# Patient Record
Sex: MALE | Race: WHITE | NOT HISPANIC OR LATINO | Employment: FULL TIME | URBAN - METROPOLITAN AREA
[De-identification: names, ages, dates, MRNs, and addresses within clinical notes are randomized per-mention and may not be internally consistent; named-entity substitution may affect disease eponyms.]

---

## 2024-10-17 ENCOUNTER — HOSPITAL ENCOUNTER (EMERGENCY)
Facility: HOSPITAL | Age: 34
Discharge: HOME | End: 2024-10-17
Attending: EMERGENCY MEDICINE
Payer: COMMERCIAL

## 2024-10-17 VITALS
HEIGHT: 69 IN | TEMPERATURE: 100 F | WEIGHT: 220 LBS | HEART RATE: 98 BPM | RESPIRATION RATE: 20 BRPM | BODY MASS INDEX: 32.58 KG/M2 | DIASTOLIC BLOOD PRESSURE: 77 MMHG | SYSTOLIC BLOOD PRESSURE: 141 MMHG | OXYGEN SATURATION: 97 %

## 2024-10-17 DIAGNOSIS — B34.9 VIRAL SYNDROME: Primary | ICD-10-CM

## 2024-10-17 DIAGNOSIS — R68.83 CHILLS: ICD-10-CM

## 2024-10-17 LAB
ALBUMIN SERPL BCP-MCNC: 4.2 G/DL (ref 3.5–5.2)
ALP SERPL-CCNC: 76 U/L (ref 40–150)
ALT SERPL W/O P-5'-P-CCNC: 43 U/L (ref 10–44)
ANION GAP SERPL CALC-SCNC: 12 MMOL/L (ref 8–16)
AST SERPL-CCNC: 23 U/L (ref 10–40)
BASOPHILS # BLD AUTO: 0.02 K/UL (ref 0–0.2)
BASOPHILS NFR BLD: 0.3 % (ref 0–1.9)
BILIRUB SERPL-MCNC: 0.5 MG/DL (ref 0.1–1)
BUN SERPL-MCNC: 19 MG/DL (ref 6–20)
CALCIUM SERPL-MCNC: 9.5 MG/DL (ref 8.7–10.5)
CHLORIDE SERPL-SCNC: 105 MMOL/L (ref 95–110)
CO2 SERPL-SCNC: 22 MMOL/L (ref 23–29)
CREAT SERPL-MCNC: 1.1 MG/DL (ref 0.5–1.4)
DIFFERENTIAL METHOD BLD: ABNORMAL
EOSINOPHIL # BLD AUTO: 0.1 K/UL (ref 0–0.5)
EOSINOPHIL NFR BLD: 1.5 % (ref 0–8)
ERYTHROCYTE [DISTWIDTH] IN BLOOD BY AUTOMATED COUNT: 13.2 % (ref 11.5–14.5)
EST. GFR  (NO RACE VARIABLE): >60 ML/MIN/1.73 M^2
GLUCOSE SERPL-MCNC: 104 MG/DL (ref 70–110)
HCT VFR BLD AUTO: 46.3 % (ref 40–54)
HGB BLD-MCNC: 15.7 G/DL (ref 14–18)
IMM GRANULOCYTES # BLD AUTO: 0.03 K/UL (ref 0–0.04)
IMM GRANULOCYTES NFR BLD AUTO: 0.5 % (ref 0–0.5)
LIPASE SERPL-CCNC: 38 U/L (ref 4–60)
LYMPHOCYTES # BLD AUTO: 0.8 K/UL (ref 1–4.8)
LYMPHOCYTES NFR BLD: 11.8 % (ref 18–48)
MCH RBC QN AUTO: 29.8 PG (ref 27–31)
MCHC RBC AUTO-ENTMCNC: 33.9 G/DL (ref 32–36)
MCV RBC AUTO: 88 FL (ref 82–98)
MONOCYTES # BLD AUTO: 0.8 K/UL (ref 0.3–1)
MONOCYTES NFR BLD: 11.6 % (ref 4–15)
NEUTROPHILS # BLD AUTO: 4.8 K/UL (ref 1.8–7.7)
NEUTROPHILS NFR BLD: 74.3 % (ref 38–73)
NRBC BLD-RTO: 0 /100 WBC
PLATELET # BLD AUTO: 175 K/UL (ref 150–450)
PMV BLD AUTO: 9.7 FL (ref 9.2–12.9)
POTASSIUM SERPL-SCNC: 3.8 MMOL/L (ref 3.5–5.1)
PROT SERPL-MCNC: 7 G/DL (ref 6–8.4)
RBC # BLD AUTO: 5.27 M/UL (ref 4.6–6.2)
SARS-COV-2 RDRP RESP QL NAA+PROBE: NEGATIVE
SODIUM SERPL-SCNC: 139 MMOL/L (ref 136–145)
WBC # BLD AUTO: 6.46 K/UL (ref 3.9–12.7)

## 2024-10-17 PROCEDURE — 87389 HIV-1 AG W/HIV-1&-2 AB AG IA: CPT | Performed by: EMERGENCY MEDICINE

## 2024-10-17 PROCEDURE — 86803 HEPATITIS C AB TEST: CPT | Performed by: EMERGENCY MEDICINE

## 2024-10-17 PROCEDURE — 85025 COMPLETE CBC W/AUTO DIFF WBC: CPT | Performed by: EMERGENCY MEDICINE

## 2024-10-17 PROCEDURE — 71045 X-RAY EXAM CHEST 1 VIEW: CPT | Mod: 26,,, | Performed by: RADIOLOGY

## 2024-10-17 PROCEDURE — 87635 SARS-COV-2 COVID-19 AMP PRB: CPT | Performed by: EMERGENCY MEDICINE

## 2024-10-17 PROCEDURE — 99284 EMERGENCY DEPT VISIT MOD MDM: CPT | Mod: 25

## 2024-10-17 PROCEDURE — 71045 X-RAY EXAM CHEST 1 VIEW: CPT | Mod: TC

## 2024-10-17 PROCEDURE — 83690 ASSAY OF LIPASE: CPT | Performed by: EMERGENCY MEDICINE

## 2024-10-17 PROCEDURE — 80053 COMPREHEN METABOLIC PANEL: CPT | Performed by: EMERGENCY MEDICINE

## 2024-10-17 RX ORDER — ANASTROZOLE 1 MG/1
TABLET ORAL
COMMUNITY
Start: 2024-10-14

## 2024-10-18 LAB
HCV AB SERPL QL IA: NORMAL
HIV 1+2 AB+HIV1 P24 AG SERPL QL IA: NORMAL

## 2024-10-18 NOTE — ED NOTES
Pt states he was standing at daughter soccer practice when started having chills with right sided ABD pain. No nausea or vomiting. Pt reports feeling better when lying down or on left side. Pt states pain is a 7 on 0/10 now. Pt reports having frequent BM today that were normal. Pt report Hx of Diverticulitis. Pt states he voids often but feel like it has decreased more than usual today. Last void at 4pm.

## 2024-10-18 NOTE — DISCHARGE INSTRUCTIONS
Take Tylenol/Motrin as needed for fever or chills.  Drink plenty of fluids.  Follow up with your primary care provider.

## 2024-10-18 NOTE — ED PROVIDER NOTES
Encounter Date: 10/17/2024       History     Chief Complaint   Patient presents with    Abdominal Pain     R side abdominal pain that started today     Patient presents to the emergency department for evaluation right-sided rib pain and chills.  Patient states he developed some chills this evening suddenly.  He has not checked his temperature.  He denies any nausea or vomiting.  He states he has developed some soreness now in his right lateral/inferior ribs.  He denies abdominal pain.  He denies any chest pain or palpitations.  He states his pain is worse with position and with coughing.  He denies any other complaints.    The history is provided by the patient.     Review of patient's allergies indicates:   Allergen Reactions    Iodine Rash     Topical      Past Medical History:   Diagnosis Date    Anxiety     Asthma     Diverticulitis      Past Surgical History:   Procedure Laterality Date    COLONOSCOPY  07/2022    TONSILLECTOMY, ADENOIDECTOMY Bilateral 12/29/2022    Procedure: TONSILLECTOMY AND ADENOIDECTOMY;  Surgeon: Avtar Wang MD;  Location: [facility];  Service: ENT;  Laterality: Bilateral;     Family History   Problem Relation Name Age of Onset    Cancer Mother      Gout Father      Cancer Maternal Grandfather      Gout Maternal Grandfather      Diabetes Paternal Grandfather       Social History     Tobacco Use    Smoking status: Never    Smokeless tobacco: Never   Substance Use Topics    Alcohol use: Yes     Comment: social     Drug use: Never     Review of Systems   Constitutional:  Positive for chills. Negative for activity change, appetite change, diaphoresis and fever.   HENT:  Negative for congestion, ear discharge, ear pain, nosebleeds, rhinorrhea, sore throat and trouble swallowing.    Eyes:  Negative for photophobia, pain, discharge and redness.   Respiratory:  Positive for cough. Negative for choking, chest tightness, shortness of breath and wheezing.    Cardiovascular:  Negative for chest  pain, palpitations and leg swelling.   Gastrointestinal:  Negative for abdominal pain, blood in stool, constipation, nausea and vomiting.   Endocrine: Negative for polydipsia and polyphagia.   Genitourinary:  Negative for dysuria, frequency and urgency.   Musculoskeletal:  Negative for back pain and neck pain.   Skin:  Negative for rash and wound.   Neurological:  Negative for dizziness, seizures, weakness, numbness and headaches.   All other systems reviewed and are negative.      Physical Exam     Initial Vitals   BP Pulse Resp Temp SpO2   10/17/24 2023 10/17/24 2021 10/17/24 2021 10/17/24 2021 10/17/24 2021   (!) 172/82 98 20 99.6 °F (37.6 °C) 97 %      MAP       --                Physical Exam    Nursing note and vitals reviewed.  Constitutional: He appears well-developed and well-nourished. No distress.   HENT:   Head: Normocephalic and atraumatic.   Right Ear: External ear normal.   Left Ear: External ear normal. Mouth/Throat: Oropharynx is clear and moist.   Eyes: EOM are normal. Pupils are equal, round, and reactive to light. Right eye exhibits no discharge.   Neck: Neck supple. No tracheal deviation present. No JVD present.   Normal range of motion.  Cardiovascular:  Normal rate and regular rhythm.           No murmur heard.  Pulmonary/Chest: Breath sounds normal. No respiratory distress. He has no wheezes. He has no rales.   Abdominal: Abdomen is soft. Bowel sounds are normal. He exhibits no distension. There is no abdominal tenderness.   Musculoskeletal:         General: Tenderness present. Normal range of motion.      Cervical back: Normal range of motion and neck supple.      Comments: Patient has palpatory tenderness in his right lateral/inferior ribs.  There is no evidence of crepitance or traumatic injury noted.     Neurological: He is alert and oriented to person, place, and time. He has normal strength. No cranial nerve deficit.   Skin: Skin is warm and dry. Capillary refill takes less than 2  seconds. No rash noted.         ED Course   Procedures  Labs Reviewed   CBC W/ AUTO DIFFERENTIAL - Abnormal       Result Value    WBC 6.46      RBC 5.27      Hemoglobin 15.7      Hematocrit 46.3      MCV 88      MCH 29.8      MCHC 33.9      RDW 13.2      Platelets 175      MPV 9.7      Immature Granulocytes 0.5      Gran # (ANC) 4.8      Immature Grans (Abs) 0.03      Lymph # 0.8 (*)     Mono # 0.8      Eos # 0.1      Baso # 0.02      nRBC 0      Gran % 74.3 (*)     Lymph % 11.8 (*)     Mono % 11.6      Eosinophil % 1.5      Basophil % 0.3      Differential Method Automated      Narrative:     Release to patient->Immediate   COMPREHENSIVE METABOLIC PANEL - Abnormal    Sodium 139      Potassium 3.8      Chloride 105      CO2 22 (*)     Glucose 104      BUN 19      Creatinine 1.1      Calcium 9.5      Total Protein 7.0      Albumin 4.2      Total Bilirubin 0.5      Alkaline Phosphatase 76      AST 23      ALT 43      eGFR >60.0      Anion Gap 12      Narrative:     Release to patient->Immediate   SARS-COV-2 RNA AMPLIFICATION, QUAL    SARS-CoV-2 RNA, Amplification, Qual Negative     LIPASE    Lipase 38      Narrative:     Release to patient->Immediate   HIV 1 / 2 ANTIBODY   HEPATITIS C ANTIBODY          Imaging Results              X-Ray Chest AP Portable (Final result)  Result time 10/17/24 21:09:53      Final result by Maya Whipple MD (10/17/24 21:09:53)                   Impression:      No acute intrathoracic abnormality identified on this single radiographic view of the chest.      Electronically signed by: Maya Whipple MD  Date:    10/17/2024  Time:    21:09               Narrative:    EXAMINATION:  XR CHEST AP PORTABLE    CLINICAL HISTORY:  Chills (without fever)    TECHNIQUE:  Single frontal view of the chest was performed.    COMPARISON:  12/28/2022    FINDINGS:  The cardiomediastinal silhouette is within normal limits. The visualized airway is unremarkable.  Mild asymmetric elevation of the right  hemidiaphragm.  The lungs demonstrate no evidence of confluent airspace consolidation, significant volume of pleural fluid or pneumothorax.  Visualized osseous structures are intact.                                       Medications - No data to display  Medical Decision Making  History is obtained from the patient.  All labs and x-rays are reviewed by myself.  Differential diagnosis includes, but is not limited to, pneumonia/pleurisy/coli cystitis/viral syndrome/pleuritic pain  Patient has no limitation to access to healthcare    Lab work and chest x-ray revealed no acute process.  There is no evidence of infection noted anywhere.  Patient has a negative COVID as well.  We will discharge patient home to follow up with his primary care provider.  I discussed all this with the patient and answered all his questions prior to discharge.    Amount and/or Complexity of Data Reviewed  Labs: ordered.  Radiology: ordered.                                      Clinical Impression:  Final diagnoses:  [R68.83] Chills  [B34.9] Viral syndrome (Primary)          ED Disposition Condition    Discharge Stable          ED Prescriptions    None       Follow-up Information       Follow up With Specialties Details Why Contact Info    Sumanth Hunt MD Family Medicine  As needed [id number] [place] [id number]               Philip Ortiz, DO  10/17/24 1974

## 2024-10-20 ENCOUNTER — HOSPITAL ENCOUNTER (EMERGENCY)
Facility: HOSPITAL | Age: 34
Discharge: HOME | End: 2024-10-20
Attending: EMERGENCY MEDICINE
Payer: COMMERCIAL

## 2024-10-20 VITALS
TEMPERATURE: 98 F | SYSTOLIC BLOOD PRESSURE: 136 MMHG | DIASTOLIC BLOOD PRESSURE: 88 MMHG | OXYGEN SATURATION: 99 % | HEIGHT: 69 IN | RESPIRATION RATE: 19 BRPM | HEART RATE: 81 BPM | WEIGHT: 220 LBS | BODY MASS INDEX: 32.58 KG/M2

## 2024-10-20 DIAGNOSIS — R07.89 CHEST WALL PAIN: ICD-10-CM

## 2024-10-20 LAB
ALBUMIN SERPL BCP-MCNC: 4.1 G/DL (ref 3.5–5.2)
ALP SERPL-CCNC: 69 U/L (ref 40–150)
ALT SERPL W/O P-5'-P-CCNC: 33 U/L (ref 10–44)
ANION GAP SERPL CALC-SCNC: 9 MMOL/L (ref 8–16)
AST SERPL-CCNC: 27 U/L (ref 10–40)
BASOPHILS # BLD AUTO: 0.01 K/UL (ref 0–0.2)
BASOPHILS NFR BLD: 0.2 % (ref 0–1.9)
BILIRUB SERPL-MCNC: 0.3 MG/DL (ref 0.1–1)
BUN SERPL-MCNC: 13 MG/DL (ref 6–20)
CALCIUM SERPL-MCNC: 9.2 MG/DL (ref 8.7–10.5)
CHLORIDE SERPL-SCNC: 107 MMOL/L (ref 95–110)
CO2 SERPL-SCNC: 24 MMOL/L (ref 23–29)
CREAT SERPL-MCNC: 0.9 MG/DL (ref 0.5–1.4)
D DIMER PPP IA.FEU-MCNC: <0.19 MG/L FEU
DIFFERENTIAL METHOD BLD: ABNORMAL
EOSINOPHIL # BLD AUTO: 0.1 K/UL (ref 0–0.5)
EOSINOPHIL NFR BLD: 1.7 % (ref 0–8)
ERYTHROCYTE [DISTWIDTH] IN BLOOD BY AUTOMATED COUNT: 13.1 % (ref 11.5–14.5)
EST. GFR  (NO RACE VARIABLE): >60 ML/MIN/1.73 M^2
GLUCOSE SERPL-MCNC: 80 MG/DL (ref 70–110)
HCT VFR BLD AUTO: 47.7 % (ref 40–54)
HGB BLD-MCNC: 16 G/DL (ref 14–18)
IMM GRANULOCYTES # BLD AUTO: 0.02 K/UL (ref 0–0.04)
IMM GRANULOCYTES NFR BLD AUTO: 0.4 % (ref 0–0.5)
LYMPHOCYTES # BLD AUTO: 0.9 K/UL (ref 1–4.8)
LYMPHOCYTES NFR BLD: 16.4 % (ref 18–48)
MCH RBC QN AUTO: 29.8 PG (ref 27–31)
MCHC RBC AUTO-ENTMCNC: 33.5 G/DL (ref 32–36)
MCV RBC AUTO: 89 FL (ref 82–98)
MONOCYTES # BLD AUTO: 0.6 K/UL (ref 0.3–1)
MONOCYTES NFR BLD: 10.3 % (ref 4–15)
NEUTROPHILS # BLD AUTO: 3.8 K/UL (ref 1.8–7.7)
NEUTROPHILS NFR BLD: 71 % (ref 38–73)
NRBC BLD-RTO: 0 /100 WBC
PLATELET # BLD AUTO: 158 K/UL (ref 150–450)
PMV BLD AUTO: 9.7 FL (ref 9.2–12.9)
POTASSIUM SERPL-SCNC: 4.2 MMOL/L (ref 3.5–5.1)
PROT SERPL-MCNC: 7.5 G/DL (ref 6–8.4)
RBC # BLD AUTO: 5.37 M/UL (ref 4.6–6.2)
SODIUM SERPL-SCNC: 140 MMOL/L (ref 136–145)
TROPONIN I SERPL DL<=0.01 NG/ML-MCNC: 0.01 NG/ML (ref 0–0.03)
WBC # BLD AUTO: 5.35 K/UL (ref 3.9–12.7)

## 2024-10-20 PROCEDURE — 93010 ELECTROCARDIOGRAM REPORT: CPT | Mod: ,,, | Performed by: INTERNAL MEDICINE

## 2024-10-20 PROCEDURE — 71046 X-RAY EXAM CHEST 2 VIEWS: CPT | Mod: TC

## 2024-10-20 PROCEDURE — 96374 THER/PROPH/DIAG INJ IV PUSH: CPT

## 2024-10-20 PROCEDURE — 80053 COMPREHEN METABOLIC PANEL: CPT | Performed by: EMERGENCY MEDICINE

## 2024-10-20 PROCEDURE — 63600175 PHARM REV CODE 636 W HCPCS: Performed by: EMERGENCY MEDICINE

## 2024-10-20 PROCEDURE — 99285 EMERGENCY DEPT VISIT HI MDM: CPT | Mod: 25

## 2024-10-20 PROCEDURE — 85379 FIBRIN DEGRADATION QUANT: CPT | Performed by: EMERGENCY MEDICINE

## 2024-10-20 PROCEDURE — 85025 COMPLETE CBC W/AUTO DIFF WBC: CPT | Performed by: EMERGENCY MEDICINE

## 2024-10-20 PROCEDURE — 93005 ELECTROCARDIOGRAM TRACING: CPT

## 2024-10-20 PROCEDURE — 71046 X-RAY EXAM CHEST 2 VIEWS: CPT | Mod: 26,,, | Performed by: RADIOLOGY

## 2024-10-20 PROCEDURE — 84484 ASSAY OF TROPONIN QUANT: CPT | Performed by: EMERGENCY MEDICINE

## 2024-10-20 RX ORDER — KETOROLAC TROMETHAMINE 30 MG/ML
30 INJECTION, SOLUTION INTRAMUSCULAR; INTRAVENOUS
Status: COMPLETED | OUTPATIENT
Start: 2024-10-20 | End: 2024-10-20

## 2024-10-20 RX ADMIN — KETOROLAC TROMETHAMINE 30 MG: 30 INJECTION, SOLUTION INTRAMUSCULAR; INTRAVENOUS at 07:10

## 2024-10-20 NOTE — Clinical Note
"Shamar Munoz"Ajay was seen and treated in our emergency department on 10/20/2024.  He may return to work on 10/23/2024.       If you have any questions or concerns, please don't hesitate to call.      Arun Kirkpatrick MD"

## 2024-10-21 LAB
OHS QRS DURATION: 94 MS
OHS QTC CALCULATION: 392 MS

## 2024-10-21 NOTE — DISCHARGE INSTRUCTIONS
As we discussed, your labs, x-ray, and EKG today did not show any evidence of significant abnormalities, and it was still probable that you are suffering from some sort of viral illness.  Take alternating Tylenol and Motrin on a scheduled basis, drink lots of liquids, get lots of rest.  Follow-up with your doctor tomorrow.  Return here as needed or if worse in any way.

## 2024-10-21 NOTE — ED PROVIDER NOTES
Encounter Date: 10/20/2024       History     Chief Complaint   Patient presents with    Chest Pain     Pt reports right rib pain that radiates to right neck onset Thursday approximately 1730. Pt reports he was seen here on Thursday with dx of viral illness. Pt denies injury. Pt reports onset diarrhea 10/19/24, intermittent chills onset Thursday, denies nausea and vomiting.      34-year-old male, here from home via private vehicle for evaluation and treatment of rib pain in the right mid axillary region, rib pain in the left mid axillary region, and pain on the right side of his neck.  Patient was seen here on Thursday, three days ago, with complaints of right-sided rib pain, and not feeling well in general, but his labs were normal and he was discharged home with a diagnosis of viral illness.  He states that he was felt somewhat better since that time, but today he developed the neck pain and left-sided rib pain and the right rib pain worsened.  He denies significant cough.  He has had some chills and subjective fever.  No nausea or vomiting, but has had some loose stools.  No dark or bloody stool.  Denies any traumatic injury.  He has taken Tylenol sporadically over the last few days but took nothing for his symptoms today.      Review of patient's allergies indicates:   Allergen Reactions    Iodine Rash     Topical      Past Medical History:   Diagnosis Date    Anxiety     Asthma     Diverticulitis      Past Surgical History:   Procedure Laterality Date    COLONOSCOPY  07/2022    TONSILLECTOMY, ADENOIDECTOMY Bilateral 12/29/2022    Procedure: TONSILLECTOMY AND ADENOIDECTOMY;  Surgeon: Avtar Wang MD;  Location: [facility];  Service: ENT;  Laterality: Bilateral;     Family History   Problem Relation Name Age of Onset    Cancer Mother      Gout Father      Cancer Maternal Grandfather      Gout Maternal Grandfather      Diabetes Paternal Grandfather       Social History     Tobacco Use    Smoking status: Never     Smokeless tobacco: Never   Substance Use Topics    Alcohol use: Yes     Comment: social     Drug use: Never     Review of Systems   Constitutional:  Positive for chills. Negative for fever.   Gastrointestinal:  Positive for diarrhea. Negative for nausea and vomiting.   Musculoskeletal:  Positive for arthralgias.   All other systems reviewed and are negative.      Physical Exam     Initial Vitals [10/20/24 1912]   BP Pulse Resp Temp SpO2   (!) 141/90 87 18 98.2 °F (36.8 °C) 99 %      MAP       --         Physical Exam    Nursing note reviewed.  Constitutional: He appears well-developed and well-nourished. He is not diaphoretic. No distress.   HENT:   Head: Normocephalic and atraumatic.   Nose: Nose normal. Mouth/Throat: Oropharynx is clear and moist. No oropharyngeal exudate.   Eyes: Conjunctivae and EOM are normal. Pupils are equal, round, and reactive to light. No scleral icterus.   Neck: Neck supple. No JVD present.   No tenderness to palpation over the cervical spine.  Mild tenderness to palpation in the right trapezius muscle .  Good range of motion, no nuchal rigidity.   Normal range of motion.  Cardiovascular:  Normal rate, regular rhythm and intact distal pulses.           Murmur heard.  Pulmonary/Chest: Breath sounds normal. No stridor. No respiratory distress. He has no wheezes. He has no rhonchi. He has no rales. He exhibits tenderness.   Tenderness to palpation in the ribs on both sides of the lateral chest wall.  No crepitus, no bruising etc..   Abdominal: He exhibits no distension. There is no abdominal tenderness. There is no rebound and no guarding.   Musculoskeletal:         General: No tenderness or edema. Normal range of motion.      Cervical back: Normal range of motion and neck supple.     Neurological: He is alert and oriented to person, place, and time. He has normal strength. No cranial nerve deficit or sensory deficit. GCS score is 15. GCS eye subscore is 4. GCS verbal subscore is 5. GCS  motor subscore is 6.   Skin: Skin is warm and dry. Capillary refill takes less than 2 seconds. No rash noted. No erythema.   Psychiatric: He has a normal mood and affect. His behavior is normal.         ED Course   Procedures  Labs Reviewed   CBC W/ AUTO DIFFERENTIAL - Abnormal       Result Value    WBC 5.35      RBC 5.37      Hemoglobin 16.0      Hematocrit 47.7      MCV 89      MCH 29.8      MCHC 33.5      RDW 13.1      Platelets 158      MPV 9.7      Immature Granulocytes 0.4      Gran # (ANC) 3.8      Immature Grans (Abs) 0.02      Lymph # 0.9 (*)     Mono # 0.6      Eos # 0.1      Baso # 0.01      nRBC 0      Gran % 71.0      Lymph % 16.4 (*)     Mono % 10.3      Eosinophil % 1.7      Basophil % 0.2      Differential Method Automated     COMPREHENSIVE METABOLIC PANEL    Sodium 140      Potassium 4.2      Chloride 107      CO2 24      Glucose 80      BUN 13      Creatinine 0.9      Calcium 9.2      Total Protein 7.5      Albumin 4.1      Total Bilirubin 0.3      Alkaline Phosphatase 69      AST 27      ALT 33      eGFR >60.0      Anion Gap 9     TROPONIN I    Troponin I 0.012     D DIMER, QUANTITATIVE    D-Dimer <0.19       EKG Readings: (Independently Interpreted)   EKG personally reviewed by me shows normal sinus rhythm, ST and T-wave changes, no obvious STEMI, 78 beats per minute, OH interval 180, .       Imaging Results              X-Ray Chest PA And Lateral (Final result)  Result time 10/20/24 20:11:40      Final result by Domenico Whipple MD (10/20/24 20:11:40)                   Impression:      No convincing radiographic evidence of acute intrathoracic process.      Electronically signed by: Domenico Whipple MD  Date:    10/20/2024  Time:    20:11               Narrative:    EXAMINATION:  XR CHEST PA AND LATERAL    CLINICAL HISTORY:  Other chest pain    TECHNIQUE:  PA and lateral views of the chest were performed.    COMPARISON:  10/17/2024    FINDINGS:  Cardiac silhouette is stable in size and  configuration.  Lung volumes are mildly diminished with resultant bronchovascular crowding.  There is mild elevation of the right hemidiaphragm.  No large confluent airspace consolidation identified.  No significant volume of pleural fluid or pneumothorax appreciated.  The visualized osseous structures appear intact.                                    X-Rays:   Independently Interpreted Readings:   Other Readings:  Chest x-ray personally reviewed by me shows clear lungs bilaterally.  Normal cardiac silhouette, normal skeletal structures.  No pneumothorax, no consolidation, no edema, no effusion.    Medications   ketorolac injection 30 mg (30 mg Intravenous Given 10/20/24 1943)     Medical Decision Making  Differential includes pneumonia, pneumothorax, costochondritis, pleurisy, pulmonary embolus, myocardial infarction, viral illness, bronchitis, etc.    His labs are unremarkable, chest x-ray is clear with no evidence of infection, no pneumothorax, no rib fractures, nothing unusual.  EKG shows no evidence for STEMI.  He has had loose stools and low-grade fever with chills for the past few days.  I believe he was likely suffering from a viral illness.  He has not been taking any anti-inflammatory medications, and has used Tylenol only sporadically.  I recommended Tylenol and Motrin on a scheduled basis for the next several days, rest, increase liquids, and follow-up with his PCP on Monday.  Return here for any worsening signs or symptoms    Amount and/or Complexity of Data Reviewed  Labs: ordered.  Radiology: ordered.    Risk  Prescription drug management.                                      Clinical Impression:  Final diagnoses:  [R07.89] Chest wall pain                 Arun Kirkpatrick MD  10/20/24 [id number]

## 2024-10-21 NOTE — ED NOTES
Patient ambulating to CT scan per choice to walk. Patient states he is getting some relief with Toradol injection. Does express pain is better if he is moving and ambulating vs lying supine. No distress. Denies any SOB or dyspnea with exertion.

## 2024-10-21 NOTE — ED NOTES
D/C to home. Follow up explained. Verbalized to return for new or worsening symptoms. Encouraged increase in PO fluids over next 24-36 hours and to use anti-inflammatory medications for discomfort. Voices understanding of treatment plan. Escorted to lobby to complete d/c process.